# Patient Record
(demographics unavailable — no encounter records)

---

## 2024-12-03 NOTE — ADDENDUM
[FreeTextEntry1] : I, Chance Shira, acted solely as a scribe for Dr. Jabari Gonsales M.D. on this date 12/02/2024.   All medical record entries made by the Scribe were at my, Dr. Jabari Gonsales M.D., direction and personally dictated by me on 12/02/2024. I have reviewed the chart and agree that the record accurately reflects my personal performance of the history, physical exam, assessment and plan. I have also personally directed, reviewed, and agreed with the chart.

## 2024-12-03 NOTE — ASSESSMENT
[FreeTextEntry1] : From asthma perspective has been stable for a long time not requiring intensification of treatment.  He has minimal use of rescue inhaler.  I think it is reasonable to try to taper his dose of inhaled steroid and I am changing him to Trelegy 100 pending increase use of rescue inhaler or exacerbation.  Will need to monitor PFTs and NIOX and patient should have an appointment in 3 months

## 2024-12-03 NOTE — HISTORY OF PRESENT ILLNESS
[TextBox_4] : 40 year old male presents for follow up office visit regarding TREMAINE and asthma. Pt previous pulmonologist who was treating his asthma retired so pt presents to receive treatment. Pt continues to receive good relief from his current medication of montelukast and Trelegy. Pt denies any respiratory or sleep issues at this time. Pt is overall feeling well.  Previously evaluated for sleep apnea had very mild TREMAINE on testing and is not currently on specific treatment

## 2025-05-23 NOTE — ADDENDUM
[FreeTextEntry1] : I, Jackelyn Clark, acted solely as a scribe for Dr. Jabari Gonsales M.D. on this date 05/23/2025.   All medical record entries made by the Scribe were at my, Dr. Jabari Gonsales M.D., direction and personally dictated by me on 05/23/2025. I have reviewed the chart and agree that the record accurately reflects my personal performance of the history, physical exam, assessment and plan. I have also personally directed, reviewed, and agreed with the chart.

## 2025-05-23 NOTE — HISTORY OF PRESENT ILLNESS
[TextBox_4] : 40 year old male presents for follow up office visit regarding asthma.  Trelegy dose strength reduced at last visit.  Since that time has only used his rescue inhaler 3 times. Pt denies any respiratory or sleep issues at this time. Pt is overall feeling well.